# Patient Record
Sex: FEMALE | Race: WHITE | URBAN - METROPOLITAN AREA
[De-identification: names, ages, dates, MRNs, and addresses within clinical notes are randomized per-mention and may not be internally consistent; named-entity substitution may affect disease eponyms.]

---

## 2021-08-04 ENCOUNTER — OFFICE VISIT (OUTPATIENT)
Dept: URBAN - METROPOLITAN AREA CLINIC 13 | Facility: CLINIC | Age: 66
End: 2021-08-04

## 2021-08-04 PROBLEM — 14304000 THYROID DISORDER: Status: ACTIVE | Noted: 2021-08-04

## 2021-08-04 PROBLEM — 398050005 DIVERTICULAR DISEASE OF COLON: Status: ACTIVE | Noted: 2021-08-04

## 2021-08-04 PROBLEM — 64226004 COLITIS: Status: ACTIVE | Noted: 2021-08-04

## 2021-08-05 ENCOUNTER — OFFICE VISIT (OUTPATIENT)
Dept: URBAN - METROPOLITAN AREA CLINIC 13 | Facility: CLINIC | Age: 66
End: 2021-08-05

## 2021-08-09 ENCOUNTER — OFFICE VISIT (OUTPATIENT)
Dept: URBAN - METROPOLITAN AREA SURGERY CENTER 28 | Facility: SURGERY CENTER | Age: 66
End: 2021-08-09

## 2021-08-28 ENCOUNTER — TELEPHONE ENCOUNTER (OUTPATIENT)
Dept: URBAN - METROPOLITAN AREA CLINIC 13 | Facility: CLINIC | Age: 66
End: 2021-08-28

## 2021-08-29 ENCOUNTER — TELEPHONE ENCOUNTER (OUTPATIENT)
Dept: URBAN - METROPOLITAN AREA CLINIC 13 | Facility: CLINIC | Age: 66
End: 2021-08-29

## 2021-08-29 RX ORDER — LEVOTHYROXINE SODIUM 150 UG/1
TABLET ORAL
Status: ACTIVE | COMMUNITY

## 2021-08-29 RX ORDER — HYDROCHLOROTHIAZIDE 50 MG/1
TABLET ORAL
Status: ACTIVE | COMMUNITY

## 2021-08-29 RX ORDER — FLUOXETINE HYDROCHLORIDE 40 MG/1
CAPSULE ORAL
Status: ACTIVE | COMMUNITY

## 2021-08-29 RX ORDER — DEXTROAMPHETAMINE SACCHARATE, AMPHETAMINE ASPARTATE, DEXTROAMPHETAMINE SULFATE, AND AMPHETAMINE SULFATE 7.5; 7.5; 7.5; 7.5 MG/1; MG/1; MG/1; MG/1
TABLET ORAL
Status: ACTIVE | COMMUNITY

## 2021-08-29 RX ORDER — ZOLPIDEM TARTRATE 10 MG
TABLET ORAL
Status: ACTIVE | COMMUNITY

## 2021-08-30 PROBLEM — 428283002 HISTORY OF POLYP OF COLON: Status: ACTIVE | Noted: 2021-08-30

## 2021-09-07 ENCOUNTER — TELEPHONE ENCOUNTER (OUTPATIENT)
Dept: URBAN - METROPOLITAN AREA SURGERY CENTER 27 | Facility: SURGERY CENTER | Age: 66
End: 2021-09-07

## 2021-09-07 ENCOUNTER — OFFICE VISIT (OUTPATIENT)
Dept: URBAN - METROPOLITAN AREA SURGERY CENTER 28 | Facility: SURGERY CENTER | Age: 66
End: 2021-09-07
Payer: COMMERCIAL

## 2021-09-07 ENCOUNTER — CLAIMS CREATED FROM THE CLAIM WINDOW (OUTPATIENT)
Dept: URBAN - METROPOLITAN AREA CLINIC 4 | Facility: CLINIC | Age: 66
End: 2021-09-07
Payer: COMMERCIAL

## 2021-09-07 DIAGNOSIS — K64.1 BLEEDING GRADE II HEMORRHOIDS: ICD-10-CM

## 2021-09-07 DIAGNOSIS — K52.831 COLLAGENOUS COLITIS: ICD-10-CM

## 2021-09-07 DIAGNOSIS — K52.831 CC (COLLAGENOUS COLITIS): ICD-10-CM

## 2021-09-07 DIAGNOSIS — K57.30 ACQUIRED DIVERTICULOSIS OF COLON: ICD-10-CM

## 2021-09-07 DIAGNOSIS — R19.7 ACUTE DIARRHEA: ICD-10-CM

## 2021-09-07 PROCEDURE — 88313 SPECIAL STAINS GROUP 2: CPT | Performed by: PATHOLOGY

## 2021-09-07 PROCEDURE — G8907 PT DOC NO EVENTS ON DISCHARG: HCPCS | Performed by: INTERNAL MEDICINE

## 2021-09-07 PROCEDURE — 45380 COLONOSCOPY AND BIOPSY: CPT | Performed by: INTERNAL MEDICINE

## 2021-09-07 PROCEDURE — 88342 IMHCHEM/IMCYTCHM 1ST ANTB: CPT | Performed by: PATHOLOGY

## 2021-09-07 PROCEDURE — 88305 TISSUE EXAM BY PATHOLOGIST: CPT | Performed by: PATHOLOGY

## 2021-09-07 RX ORDER — LEVOTHYROXINE SODIUM 150 UG/1
TABLET ORAL
Status: ACTIVE | COMMUNITY

## 2021-09-07 RX ORDER — ZOLPIDEM TARTRATE 10 MG
TABLET ORAL
Status: ACTIVE | COMMUNITY

## 2021-09-07 RX ORDER — DEXTROAMPHETAMINE SACCHARATE, AMPHETAMINE ASPARTATE, DEXTROAMPHETAMINE SULFATE, AND AMPHETAMINE SULFATE 7.5; 7.5; 7.5; 7.5 MG/1; MG/1; MG/1; MG/1
TABLET ORAL
Status: ACTIVE | COMMUNITY

## 2021-09-07 RX ORDER — FLUOXETINE HYDROCHLORIDE 40 MG/1
CAPSULE ORAL
Status: ACTIVE | COMMUNITY

## 2021-09-07 RX ORDER — HYDROCHLOROTHIAZIDE 50 MG/1
TABLET ORAL
Status: ACTIVE | COMMUNITY

## 2021-09-07 RX ORDER — DICYCLOMINE HYDROCHLORIDE 20 MG/1
1 TABLET TABLET ORAL
Qty: 90 | Refills: 3 | OUTPATIENT
Start: 2021-09-07 | End: 2022-01-04

## 2021-09-15 ENCOUNTER — TELEPHONE ENCOUNTER (OUTPATIENT)
Dept: URBAN - METROPOLITAN AREA SURGERY CENTER 27 | Facility: SURGERY CENTER | Age: 66
End: 2021-09-15

## 2021-09-15 RX ORDER — BUDESONIDE 3 MG/1
3 CAPSULES CAPSULE, COATED PELLETS ORAL ONCE A DAY
Qty: 90 | Refills: 4 | OUTPATIENT
Start: 2021-09-15

## 2021-09-29 ENCOUNTER — ERX REFILL RESPONSE (OUTPATIENT)
Dept: URBAN - METROPOLITAN AREA CLINIC 46 | Facility: CLINIC | Age: 66
End: 2021-09-29

## 2021-09-29 RX ORDER — DICYCLOMINE HYDROCHLORIDE 20 MG/1
1 TABLET ORALLY THREE TIMES A DAY 30 MIN BEFORE MEALS TABLET ORAL
Qty: 90 TABLET | Refills: 4 | OUTPATIENT

## 2021-09-29 RX ORDER — DICYCLOMINE HYDROCHLORIDE 20 MG/1
1 TABLET TABLET ORAL
Qty: 90 | Refills: 3 | OUTPATIENT

## 2021-10-22 ENCOUNTER — OFFICE VISIT (OUTPATIENT)
Dept: URBAN - NONMETROPOLITAN AREA CLINIC 11 | Facility: CLINIC | Age: 66
End: 2021-10-22
Payer: COMMERCIAL

## 2021-10-22 VITALS
DIASTOLIC BLOOD PRESSURE: 62 MMHG | WEIGHT: 147 LBS | HEIGHT: 69 IN | HEART RATE: 81 BPM | BODY MASS INDEX: 21.77 KG/M2 | SYSTOLIC BLOOD PRESSURE: 125 MMHG | TEMPERATURE: 97.2 F

## 2021-10-22 DIAGNOSIS — K52.831 COLLAGENOUS COLITIS: ICD-10-CM

## 2021-10-22 PROCEDURE — 99213 OFFICE O/P EST LOW 20 MIN: CPT | Performed by: INTERNAL MEDICINE

## 2021-10-22 RX ORDER — DEXTROAMPHETAMINE SACCHARATE, AMPHETAMINE ASPARTATE, DEXTROAMPHETAMINE SULFATE, AND AMPHETAMINE SULFATE 7.5; 7.5; 7.5; 7.5 MG/1; MG/1; MG/1; MG/1
TABLET ORAL
COMMUNITY

## 2021-10-22 RX ORDER — ZOLPIDEM TARTRATE 10 MG
TABLET ORAL
COMMUNITY

## 2021-10-22 RX ORDER — BUDESONIDE 3 MG/1
3 CAPSULES CAPSULE, COATED PELLETS ORAL ONCE A DAY
Qty: 90 | Refills: 4 | COMMUNITY
Start: 2021-09-15

## 2021-10-22 RX ORDER — DICYCLOMINE HYDROCHLORIDE 20 MG/1
1 TABLET ORALLY THREE TIMES A DAY 30 MIN BEFORE MEALS TABLET ORAL
Qty: 90 TABLET | Refills: 4 | COMMUNITY

## 2021-10-22 RX ORDER — HYDROCHLOROTHIAZIDE 50 MG/1
TABLET ORAL
COMMUNITY

## 2021-10-22 RX ORDER — FLUOXETINE HYDROCHLORIDE 40 MG/1
CAPSULE ORAL
COMMUNITY

## 2021-10-22 RX ORDER — LEVOTHYROXINE SODIUM 150 UG/1
TABLET ORAL
COMMUNITY

## 2021-10-22 NOTE — HPI-TODAY'S VISIT:
65 year old female with collagenous colitis is here for follow-up evaluation.  The patient incorrectly took the budesonide only 3 mg daily instead of 9 daily.  She is overall improved but has 2 loose stools daily.  Most recently she slipped and broke her foot.  She took 2 pain pills in her diarrhea temporarily stopped.  She got constipated and took them to Dulcolax but she continues to have 2 loose stools daily.

## 2021-12-01 ENCOUNTER — TELEPHONE ENCOUNTER (OUTPATIENT)
Dept: URBAN - METROPOLITAN AREA CLINIC 44 | Facility: CLINIC | Age: 66
End: 2021-12-01

## 2021-12-01 VITALS — BODY MASS INDEX: 21.77 KG/M2 | HEIGHT: 69 IN | WEIGHT: 147 LBS

## 2021-12-01 RX ORDER — DEXTROAMPHETAMINE SACCHARATE, AMPHETAMINE ASPARTATE, DEXTROAMPHETAMINE SULFATE, AND AMPHETAMINE SULFATE 7.5; 7.5; 7.5; 7.5 MG/1; MG/1; MG/1; MG/1
TABLET ORAL
Status: ACTIVE | COMMUNITY

## 2021-12-01 RX ORDER — CHOLESTYRAMINE 4 G/9G
1 PACKET MIXED WITH WATER OR NON-CARBONATED DRINK POWDER, FOR SUSPENSION ORAL TWICE A DAY
Qty: 60 | Refills: 6 | OUTPATIENT
Start: 2021-12-03

## 2021-12-01 RX ORDER — HYDROCHLOROTHIAZIDE 50 MG/1
TABLET ORAL
Status: ACTIVE | COMMUNITY

## 2021-12-01 RX ORDER — ZOLPIDEM TARTRATE 10 MG
TABLET ORAL
Status: ACTIVE | COMMUNITY

## 2021-12-01 RX ORDER — FLUOXETINE HYDROCHLORIDE 40 MG/1
CAPSULE ORAL
Status: ACTIVE | COMMUNITY

## 2021-12-01 RX ORDER — DICYCLOMINE HYDROCHLORIDE 20 MG/1
1 TABLET ORALLY THREE TIMES A DAY 30 MIN BEFORE MEALS TABLET ORAL
Qty: 90 TABLET | Refills: 4 | Status: ACTIVE | COMMUNITY

## 2021-12-01 RX ORDER — LEVOTHYROXINE SODIUM 150 UG/1
TABLET ORAL
Status: ACTIVE | COMMUNITY

## 2021-12-01 RX ORDER — BUDESONIDE 3 MG/1
3 CAPSULES CAPSULE, COATED PELLETS ORAL ONCE A DAY
Qty: 90 | Refills: 4 | Status: ACTIVE | COMMUNITY
Start: 2021-09-15

## 2021-12-27 ENCOUNTER — ERX REFILL RESPONSE (OUTPATIENT)
Dept: URBAN - METROPOLITAN AREA CLINIC 46 | Facility: CLINIC | Age: 66
End: 2021-12-27

## 2021-12-27 RX ORDER — DICYCLOMINE HYDROCHLORIDE 20 MG/1
TAKE 1 TABLET BY MOUTH THREE TIMES A DAY 30 MINUTES BEFORE MEALS TABLET ORAL
Qty: 270 TABLET | Refills: 4 | OUTPATIENT

## 2021-12-27 RX ORDER — DICYCLOMINE HYDROCHLORIDE 20 MG/1
1 TABLET ORALLY THREE TIMES A DAY 30 MIN BEFORE MEALS TABLET ORAL
Qty: 90 TABLET | Refills: 4 | OUTPATIENT

## 2021-12-28 ENCOUNTER — ERX REFILL RESPONSE (OUTPATIENT)
Dept: URBAN - METROPOLITAN AREA CLINIC 44 | Facility: CLINIC | Age: 66
End: 2021-12-28

## 2021-12-28 RX ORDER — CHOLESTYRAMINE 4 G/9G
1 PACKET MIXED WITH WATER OR NON-CARBONATED DRINK ORALLY TWICE A DAY 30 DAY(S) POWDER, FOR SUSPENSION ORAL
Qty: 60 PACK | Refills: 7 | OUTPATIENT

## 2021-12-28 RX ORDER — CHOLESTYRAMINE 4 G/9G
1 PACKET MIXED WITH WATER OR NON-CARBONATED DRINK POWDER, FOR SUSPENSION ORAL TWICE A DAY
Qty: 60 | Refills: 6 | OUTPATIENT

## 2022-01-14 ENCOUNTER — OFFICE VISIT (OUTPATIENT)
Dept: URBAN - NONMETROPOLITAN AREA CLINIC 11 | Facility: CLINIC | Age: 67
End: 2022-01-14
Payer: COMMERCIAL

## 2022-01-14 VITALS
BODY MASS INDEX: 23.92 KG/M2 | WEIGHT: 161.5 LBS | HEART RATE: 72 BPM | DIASTOLIC BLOOD PRESSURE: 71 MMHG | HEIGHT: 69 IN | SYSTOLIC BLOOD PRESSURE: 137 MMHG | TEMPERATURE: 98.8 F

## 2022-01-14 DIAGNOSIS — K52.831 COLLAGENOUS COLITIS: ICD-10-CM

## 2022-01-14 PROCEDURE — 99214 OFFICE O/P EST MOD 30 MIN: CPT | Performed by: INTERNAL MEDICINE

## 2022-01-14 RX ORDER — BUDESONIDE 3 MG/1
3 CAPSULES CAPSULE, COATED PELLETS ORAL ONCE A DAY
Qty: 90 CAPSULE | Refills: 6 | OUTPATIENT
Start: 2022-01-16

## 2022-01-14 RX ORDER — HYDROCHLOROTHIAZIDE 50 MG/1
1 TABLET IN THE MORNING TABLET ORAL ONCE A DAY
Status: ACTIVE | COMMUNITY

## 2022-01-14 RX ORDER — CHOLESTYRAMINE 4 G/9G
1 PACKET MIXED WITH WATER OR NON-CARBONATED DRINK ORALLY TWICE A DAY 30 DAY(S) POWDER, FOR SUSPENSION ORAL
Qty: 60 PACK | Refills: 7 | Status: ACTIVE | COMMUNITY

## 2022-01-14 RX ORDER — LEVOTHYROXINE SODIUM 0.17 MG/1
1 TABLET IN THE MORNING ON AN EMPTY STOMACH TABLET ORAL ONCE A DAY
Status: ACTIVE | COMMUNITY

## 2022-01-14 RX ORDER — BUDESONIDE 3 MG/1
3 CAPSULES CAPSULE, COATED PELLETS ORAL ONCE A DAY
Qty: 90 | Refills: 4 | Status: ON HOLD | COMMUNITY
Start: 2021-09-15

## 2022-01-14 RX ORDER — ZOLPIDEM TARTRATE 10 MG
1 TABLET AT BEDTIME TABLET ORAL ONCE A DAY
Status: ACTIVE | COMMUNITY

## 2022-01-14 RX ORDER — CITALOPRAM 40 MG/1
0.5 TABLET TABLET, FILM COATED ORAL ONCE A DAY
Status: ACTIVE | COMMUNITY

## 2022-01-14 RX ORDER — DICYCLOMINE HYDROCHLORIDE 20 MG/1
TAKE 1 TABLET BY MOUTH THREE TIMES A DAY 30 MINUTES BEFORE MEALS TABLET ORAL
Qty: 270 TABLET | Refills: 4 | Status: ON HOLD | COMMUNITY

## 2022-01-14 NOTE — HPI-TODAY'S VISIT:
66 year old female with collagenous colitis is here for follow-up evaluation.  She reports she came off of the budesonide and is now on cholestyramine powder.  Patient complains of unpredictable bowel movements with urgency gas and fecal incontinence.  The patient wishes to resume the budesonide 9 mg daily.  We also discussed taking Imodium as needed prior to travel.

## 2022-04-08 ENCOUNTER — OFFICE VISIT (OUTPATIENT)
Dept: URBAN - NONMETROPOLITAN AREA CLINIC 11 | Facility: CLINIC | Age: 67
End: 2022-04-08

## 2022-04-08 RX ORDER — BUDESONIDE 3 MG/1
3 CAPSULES CAPSULE, COATED PELLETS ORAL ONCE A DAY
Qty: 90 CAPSULE | Refills: 6 | Status: ACTIVE | COMMUNITY
Start: 2022-01-16

## 2022-04-08 RX ORDER — CITALOPRAM 40 MG/1
0.5 TABLET TABLET, FILM COATED ORAL ONCE A DAY
Status: ACTIVE | COMMUNITY

## 2022-04-08 RX ORDER — HYDROCHLOROTHIAZIDE 50 MG/1
1 TABLET IN THE MORNING TABLET ORAL ONCE A DAY
Status: ACTIVE | COMMUNITY

## 2022-04-08 RX ORDER — CHOLESTYRAMINE 4 G/9G
1 PACKET MIXED WITH WATER OR NON-CARBONATED DRINK ORALLY TWICE A DAY 30 DAY(S) POWDER, FOR SUSPENSION ORAL
Qty: 60 PACK | Refills: 7 | Status: ACTIVE | COMMUNITY

## 2022-04-08 RX ORDER — ZOLPIDEM TARTRATE 10 MG
1 TABLET AT BEDTIME TABLET ORAL ONCE A DAY
Status: ACTIVE | COMMUNITY

## 2022-04-08 RX ORDER — LEVOTHYROXINE SODIUM 0.17 MG/1
1 TABLET IN THE MORNING ON AN EMPTY STOMACH TABLET ORAL ONCE A DAY
Status: ACTIVE | COMMUNITY

## 2022-04-08 NOTE — HPI-TODAY'S VISIT:
66 year old female with collagenous colitis is here for follow-up evaluation.  Currently on  budesonide 9mg daily. Last Colonoscopy 09/2021, Diverticulosis in the sigmoid colon. Biopsied. Non-bleeding internal hemorrhoids.

## 2022-06-03 ENCOUNTER — OFFICE VISIT (OUTPATIENT)
Dept: URBAN - NONMETROPOLITAN AREA CLINIC 11 | Facility: CLINIC | Age: 67
End: 2022-06-03
Payer: COMMERCIAL

## 2022-06-03 VITALS
SYSTOLIC BLOOD PRESSURE: 140 MMHG | OXYGEN SATURATION: 98 % | TEMPERATURE: 98.1 F | WEIGHT: 160.6 LBS | DIASTOLIC BLOOD PRESSURE: 79 MMHG | BODY MASS INDEX: 23.79 KG/M2 | HEIGHT: 69 IN | HEART RATE: 76 BPM

## 2022-06-03 DIAGNOSIS — K52.831 COLLAGENOUS COLITIS: ICD-10-CM

## 2022-06-03 PROCEDURE — 99214 OFFICE O/P EST MOD 30 MIN: CPT | Performed by: INTERNAL MEDICINE

## 2022-06-03 RX ORDER — NICOTINE 14MG/24HR
1 CAPSULE PATCH, TRANSDERMAL 24 HOURS TRANSDERMAL TWICE A DAY
Status: ACTIVE | COMMUNITY

## 2022-06-03 RX ORDER — CITALOPRAM 40 MG/1
0.5 TABLET TABLET, FILM COATED ORAL ONCE A DAY
Status: ACTIVE | COMMUNITY

## 2022-06-03 RX ORDER — HYDROCHLOROTHIAZIDE 50 MG/1
1 TABLET IN THE MORNING TABLET ORAL ONCE A DAY
Status: ACTIVE | COMMUNITY

## 2022-06-03 RX ORDER — BUDESONIDE 3 MG/1
3 CAPSULES CAPSULE, COATED PELLETS ORAL ONCE A DAY
Qty: 90 CAPSULE | Refills: 6 | Status: ACTIVE | COMMUNITY
Start: 2022-01-16

## 2022-06-03 RX ORDER — LEVOTHYROXINE SODIUM 150 UG/1
1 TABLET IN THE MORNING ON AN EMPTY STOMACH CAPSULE ORAL ONCE A DAY
Status: ACTIVE | COMMUNITY

## 2022-06-03 RX ORDER — ZOLPIDEM TARTRATE 10 MG
1 TABLET AT BEDTIME TABLET ORAL ONCE A DAY
Status: ACTIVE | COMMUNITY

## 2022-06-03 RX ORDER — DICYCLOMINE HYDROCHLORIDE 20 MG/1
TAKE 1 TABLET BY MOUTH THREE TIMES A DAY 30 MINUTES BEFORE MEALS TABLET ORAL
Qty: 270 TABLET | Refills: 4 | Status: ACTIVE | COMMUNITY

## 2022-06-03 RX ORDER — GLUCOSAM/CHONDRO/HERB 149/HYAL 750-100 MG
1 CAPSULE TABLET ORAL ONCE A DAY
Status: ACTIVE | COMMUNITY

## 2022-06-03 NOTE — HPI-TODAY'S VISIT:
66-year-old female with collagenous colitis here for follow-up evaluation.  The patient continues to have 3 loose stools per day.  The patient is becoming frustrated and anxious.  She reports she does not use the Imodium or Lomotil on a daily basis.  We discussed slowing the frequency of her stools with the Lomotil will allow the budesonide being more effective.  The patient agrees. No

## 2022-06-27 ENCOUNTER — DASHBOARD ENCOUNTERS (OUTPATIENT)
Age: 67
End: 2022-06-27

## 2022-07-01 ENCOUNTER — OFFICE VISIT (OUTPATIENT)
Dept: URBAN - NONMETROPOLITAN AREA CLINIC 11 | Facility: CLINIC | Age: 67
End: 2022-07-01
Payer: COMMERCIAL

## 2022-07-01 DIAGNOSIS — K52.831 COLLAGENOUS COLITIS: ICD-10-CM

## 2022-07-01 PROCEDURE — 99213 OFFICE O/P EST LOW 20 MIN: CPT | Performed by: INTERNAL MEDICINE

## 2022-07-01 RX ORDER — BUDESONIDE 3 MG/1
3 CAPSULES CAPSULE, COATED PELLETS ORAL ONCE A DAY
Qty: 90 CAPSULE | Refills: 6 | Status: ACTIVE | COMMUNITY
Start: 2022-01-16

## 2022-07-01 RX ORDER — DICYCLOMINE HYDROCHLORIDE 20 MG/1
TAKE 1 TABLET BY MOUTH THREE TIMES A DAY 30 MINUTES BEFORE MEALS TABLET ORAL
Qty: 270 TABLET | Refills: 4 | Status: ACTIVE | COMMUNITY

## 2022-07-01 RX ORDER — GLUCOSAM/CHONDRO/HERB 149/HYAL 750-100 MG
1 CAPSULE TABLET ORAL ONCE A DAY
Status: ACTIVE | COMMUNITY

## 2022-07-01 RX ORDER — ZOLPIDEM TARTRATE 10 MG
1 TABLET AT BEDTIME TABLET ORAL ONCE A DAY
Status: ACTIVE | COMMUNITY

## 2022-07-01 RX ORDER — CITALOPRAM 40 MG/1
0.5 TABLET TABLET, FILM COATED ORAL ONCE A DAY
Status: ACTIVE | COMMUNITY

## 2022-07-01 RX ORDER — LEVOTHYROXINE SODIUM 150 UG/1
1 TABLET IN THE MORNING ON AN EMPTY STOMACH CAPSULE ORAL ONCE A DAY
Status: ACTIVE | COMMUNITY

## 2022-07-01 RX ORDER — NICOTINE 14MG/24HR
1 CAPSULE PATCH, TRANSDERMAL 24 HOURS TRANSDERMAL TWICE A DAY
Status: ACTIVE | COMMUNITY

## 2022-07-01 RX ORDER — HYDROCHLOROTHIAZIDE 50 MG/1
1 TABLET IN THE MORNING TABLET ORAL ONCE A DAY
Status: ACTIVE | COMMUNITY

## 2022-07-01 NOTE — HPI-TODAY'S VISIT:
61-year-old female is here for follow-up evaluation of diarrhea due to collagenous  colitis.  Currently the patient has changed which she is using Lomotil and she is now having 1 formed bowel movement daily.  Her appetite is good and her weight is stable.  The patient feels much better.  We discussed her tapering the budesonide slowly over the next month.

## 2022-07-02 PROBLEM — 19311003 COLLAGENOUS COLITIS: Status: ACTIVE | Noted: 2021-10-22

## 2022-10-24 ENCOUNTER — TELEPHONE ENCOUNTER (OUTPATIENT)
Dept: URBAN - METROPOLITAN AREA CLINIC 44 | Facility: CLINIC | Age: 67
End: 2022-10-24

## 2022-10-28 ENCOUNTER — OFFICE VISIT (OUTPATIENT)
Dept: URBAN - NONMETROPOLITAN AREA CLINIC 11 | Facility: CLINIC | Age: 67
End: 2022-10-28